# Patient Record
Sex: FEMALE | Race: WHITE | RURAL
[De-identification: names, ages, dates, MRNs, and addresses within clinical notes are randomized per-mention and may not be internally consistent; named-entity substitution may affect disease eponyms.]

---

## 2017-02-16 ENCOUNTER — APPOINTMENT (RX ONLY)
Dept: RURAL CLINIC 2 | Facility: CLINIC | Age: 72
Setting detail: DERMATOLOGY
End: 2017-02-16

## 2017-02-16 DIAGNOSIS — L72.8 OTHER FOLLICULAR CYSTS OF THE SKIN AND SUBCUTANEOUS TISSUE: ICD-10-CM

## 2017-02-16 DIAGNOSIS — D22 MELANOCYTIC NEVI: ICD-10-CM

## 2017-02-16 DIAGNOSIS — L82.1 OTHER SEBORRHEIC KERATOSIS: ICD-10-CM

## 2017-02-16 DIAGNOSIS — D69.2 OTHER NONTHROMBOCYTOPENIC PURPURA: ICD-10-CM

## 2017-02-16 PROBLEM — D22.61 MELANOCYTIC NEVI OF RIGHT UPPER LIMB, INCLUDING SHOULDER: Status: ACTIVE | Noted: 2017-02-16

## 2017-02-16 PROBLEM — D22.5 MELANOCYTIC NEVI OF TRUNK: Status: ACTIVE | Noted: 2017-02-16

## 2017-02-16 PROBLEM — D22.62 MELANOCYTIC NEVI OF LEFT UPPER LIMB, INCLUDING SHOULDER: Status: ACTIVE | Noted: 2017-02-16

## 2017-02-16 PROBLEM — L23.7 ALLERGIC CONTACT DERMATITIS DUE TO PLANTS, EXCEPT FOOD: Status: ACTIVE | Noted: 2017-02-16

## 2017-02-16 PROBLEM — D48.5 NEOPLASM OF UNCERTAIN BEHAVIOR OF SKIN: Status: ACTIVE | Noted: 2017-02-16

## 2017-02-16 PROBLEM — D22.71 MELANOCYTIC NEVI OF RIGHT LOWER LIMB, INCLUDING HIP: Status: ACTIVE | Noted: 2017-02-16

## 2017-02-16 PROCEDURE — 99214 OFFICE O/P EST MOD 30 MIN: CPT

## 2017-02-16 PROCEDURE — ? OBSERVATION AND MEASURE

## 2017-02-16 PROCEDURE — ? COUNSELING

## 2017-02-16 ASSESSMENT — LOCATION DETAILED DESCRIPTION DERM
LOCATION DETAILED: LEFT ANTERIOR LATERAL DISTAL UPPER ARM
LOCATION DETAILED: RIGHT POSTERIOR NECK
LOCATION DETAILED: LEFT SUPERIOR CENTRAL BUCCAL CHEEK
LOCATION DETAILED: RIGHT RADIAL DORSAL HAND
LOCATION DETAILED: LEFT DORSAL RING FINGER METACARPOPHALANGEAL JOINT
LOCATION DETAILED: RIGHT ANTERIOR PROXIMAL UPPER ARM
LOCATION DETAILED: LEFT MID-UPPER BACK
LOCATION DETAILED: LEFT PROXIMAL DORSAL FOREARM
LOCATION DETAILED: RIGHT DORSAL MIDDLE FINGER METACARPOPHALANGEAL JOINT
LOCATION DETAILED: RIGHT PROXIMAL DORSAL FOREARM
LOCATION DETAILED: RIGHT PROXIMAL POSTERIOR THIGH

## 2017-02-16 ASSESSMENT — LOCATION SIMPLE DESCRIPTION DERM
LOCATION SIMPLE: LEFT CHEEK
LOCATION SIMPLE: RIGHT UPPER ARM
LOCATION SIMPLE: LEFT FOREARM
LOCATION SIMPLE: POSTERIOR NECK
LOCATION SIMPLE: RIGHT HAND
LOCATION SIMPLE: RIGHT FOREARM
LOCATION SIMPLE: LEFT UPPER BACK
LOCATION SIMPLE: RIGHT POSTERIOR THIGH
LOCATION SIMPLE: LEFT HAND
LOCATION SIMPLE: LEFT UPPER ARM

## 2017-02-16 ASSESSMENT — LOCATION ZONE DERM
LOCATION ZONE: LEG
LOCATION ZONE: NECK
LOCATION ZONE: ARM
LOCATION ZONE: FACE
LOCATION ZONE: HAND
LOCATION ZONE: TRUNK

## 2017-02-16 NOTE — PROCEDURE: OBSERVATION
Size Of Lesion: .4 x .4 cm
Detail Level: Simple
Size Of Lesion: .3 x .2 cm
Body Location Override (Optional - Billing Will Still Be Based On Selected Body Map Location If Applicable): Left upper arm
Body Location Override (Optional - Billing Will Still Be Based On Selected Body Map Location If Applicable): Left upper lateral back
Body Location Override (Optional - Billing Will Still Be Based On Selected Body Map Location If Applicable): Right dorsal hand distal
Size Of Lesion: .2 x .2 cm
Body Location Override (Optional - Billing Will Still Be Based On Selected Body Map Location If Applicable): Left dorsal hand
Body Location Override (Optional - Billing Will Still Be Based On Selected Body Map Location If Applicable): Right dorsal hand proximal
Size Of Lesion: .1 x .1 cm
Size Of Lesion: .7 x .4 cm
Body Location Override (Optional - Billing Will Still Be Based On Selected Body Map Location If Applicable): Right upper arm
Size Of Lesion: .1 x .1 cm
Size Of Lesion: 1.5 x 1 cm
Body Location Override (Optional - Billing Will Still Be Based On Selected Body Map Location If Applicable): Left lower Cheek
Size Of Lesion: .2 x .2 cm
Body Location Override (Optional - Billing Will Still Be Based On Selected Body Map Location If Applicable): Right upper posterior thigh

## 2017-10-05 ENCOUNTER — APPOINTMENT (RX ONLY)
Dept: RURAL CLINIC 2 | Facility: CLINIC | Age: 72
Setting detail: DERMATOLOGY
End: 2017-10-05

## 2017-10-05 DIAGNOSIS — D22 MELANOCYTIC NEVI: ICD-10-CM

## 2017-10-05 DIAGNOSIS — L72.8 OTHER FOLLICULAR CYSTS OF THE SKIN AND SUBCUTANEOUS TISSUE: ICD-10-CM

## 2017-10-05 DIAGNOSIS — L82.0 INFLAMED SEBORRHEIC KERATOSIS: ICD-10-CM

## 2017-10-05 DIAGNOSIS — L82.1 OTHER SEBORRHEIC KERATOSIS: ICD-10-CM

## 2017-10-05 PROBLEM — D48.5 NEOPLASM OF UNCERTAIN BEHAVIOR OF SKIN: Status: ACTIVE | Noted: 2017-10-05

## 2017-10-05 PROBLEM — D22.71 MELANOCYTIC NEVI OF RIGHT LOWER LIMB, INCLUDING HIP: Status: ACTIVE | Noted: 2017-10-05

## 2017-10-05 PROBLEM — D22.5 MELANOCYTIC NEVI OF TRUNK: Status: ACTIVE | Noted: 2017-10-05

## 2017-10-05 PROBLEM — D22.61 MELANOCYTIC NEVI OF RIGHT UPPER LIMB, INCLUDING SHOULDER: Status: ACTIVE | Noted: 2017-10-05

## 2017-10-05 PROBLEM — D22.62 MELANOCYTIC NEVI OF LEFT UPPER LIMB, INCLUDING SHOULDER: Status: ACTIVE | Noted: 2017-10-05

## 2017-10-05 PROCEDURE — ? BIOPSY BY PUNCH METHOD

## 2017-10-05 PROCEDURE — ? OBSERVATION AND MEASURE

## 2017-10-05 PROCEDURE — 99214 OFFICE O/P EST MOD 30 MIN: CPT | Mod: 25

## 2017-10-05 PROCEDURE — 11100: CPT

## 2017-10-05 PROCEDURE — ? COUNSELING

## 2017-10-05 ASSESSMENT — LOCATION DETAILED DESCRIPTION DERM
LOCATION DETAILED: LEFT CENTRAL BUCCAL CHEEK
LOCATION DETAILED: MID POSTERIOR NECK
LOCATION DETAILED: LEFT INFERIOR MEDIAL UPPER BACK
LOCATION DETAILED: LEFT DISTAL POSTERIOR UPPER ARM
LOCATION DETAILED: RIGHT RADIAL DORSAL HAND
LOCATION DETAILED: LEFT DORSAL RING FINGER METACARPOPHALANGEAL JOINT
LOCATION DETAILED: LEFT MID-UPPER BACK
LOCATION DETAILED: RIGHT DORSAL MIDDLE FINGER METACARPOPHALANGEAL JOINT
LOCATION DETAILED: RIGHT PROXIMAL POSTERIOR THIGH
LOCATION DETAILED: RIGHT LATERAL PROXIMAL UPPER ARM

## 2017-10-05 ASSESSMENT — LOCATION SIMPLE DESCRIPTION DERM
LOCATION SIMPLE: LEFT HAND
LOCATION SIMPLE: POSTERIOR NECK
LOCATION SIMPLE: LEFT CHEEK
LOCATION SIMPLE: RIGHT UPPER ARM
LOCATION SIMPLE: LEFT POSTERIOR UPPER ARM
LOCATION SIMPLE: RIGHT POSTERIOR THIGH
LOCATION SIMPLE: LEFT UPPER BACK
LOCATION SIMPLE: RIGHT HAND

## 2017-10-05 ASSESSMENT — LOCATION ZONE DERM
LOCATION ZONE: FACE
LOCATION ZONE: NECK
LOCATION ZONE: LEG
LOCATION ZONE: HAND
LOCATION ZONE: TRUNK
LOCATION ZONE: ARM

## 2017-10-05 NOTE — PROCEDURE: BIOPSY BY PUNCH METHOD
Post-Care Instructions: I reviewed with the patient in detail post-care instructions. Patient is to keep the biopsy site dry overnight, and then apply bacitracin twice daily until healed. Patient may apply hydrogen peroxide soaks to remove any crusting.
Billing Type: Third-Party Bill
Lab: [id number]
Epidermal Sutures: 4-0 Nylon
Size Of Lesion In Cm (Optional): 0.2
Notification Instructions: Patient will be notified of biopsy results. However, patient instructed to call the office if not contacted within 2 weeks.
Consent: Verbal consent was obtained and risks were reviewed including but not limited to scarring, infection, bleeding, scabbing, incomplete removal, nerve damage and allergy to anesthesia.
Bill For Surgical Tray: no
Additional Anesthesia Volume In Cc (Will Not Render If 0): 0
Patient Will Remove Sutures At Home?: Yes
Biopsy Type: H and E
Dressing: bandage
Lab Facility: [id number]
Detail Level: Simple
Wound Care: Mupirocin
Home Suture Removal Text: Patient was provided a home suture removal kit and will remove their sutures at home.  If they have any questions or difficulties they will call the office.
Suture Removal: 10 days
Anesthesia Volume In Cc (Will Not Render If 0): 0.5
Anesthesia Type: 2% lidocaine with epinephrine
Hemostasis: None
Body Location Override (Optional - Billing Will Still Be Based On Selected Body Map Location If Applicable): right upper arm
Punch Size In Mm: 2

## 2017-10-05 NOTE — PROCEDURE: OBSERVATION
Body Location Override (Optional - Billing Will Still Be Based On Selected Body Map Location If Applicable): left upper lateral back
Body Location Override (Optional - Billing Will Still Be Based On Selected Body Map Location If Applicable): right dorsal hand distal
Detail Level: Detailed
Size Of Lesion: .3 x .2 cm
Body Location Override (Optional - Billing Will Still Be Based On Selected Body Map Location If Applicable): left upper arm
Size Of Lesion: .1 x .1 cm
Body Location Override (Optional - Billing Will Still Be Based On Selected Body Map Location If Applicable): left dorsal hand
Size Of Lesion: .3 x .2 cm
Body Location Override (Optional - Billing Will Still Be Based On Selected Body Map Location If Applicable): left lower Cheek
Body Location Override (Optional - Billing Will Still Be Based On Selected Body Map Location If Applicable): right upper posterior thigh
Size Of Lesion: .4 x .4 cm
Size Of Lesion: 1.5 x 1 cm
Size Of Lesion: .2 x .2 cm
Body Location Override (Optional - Billing Will Still Be Based On Selected Body Map Location If Applicable): right dorsal hand proximal
Size Of Lesion: .7 x .4 cm

## 2018-07-12 ENCOUNTER — APPOINTMENT (RX ONLY)
Dept: RURAL CLINIC 1 | Facility: CLINIC | Age: 73
Setting detail: DERMATOLOGY
End: 2018-07-12

## 2018-07-12 DIAGNOSIS — Z41.9 ENCOUNTER FOR PROCEDURE FOR PURPOSES OTHER THAN REMEDYING HEALTH STATE, UNSPECIFIED: ICD-10-CM

## 2018-07-12 DIAGNOSIS — B35.3 TINEA PEDIS: ICD-10-CM

## 2018-07-12 DIAGNOSIS — D22 MELANOCYTIC NEVI: ICD-10-CM

## 2018-07-12 DIAGNOSIS — L64.8 OTHER ANDROGENIC ALOPECIA: ICD-10-CM

## 2018-07-12 DIAGNOSIS — D18.0 HEMANGIOMA: ICD-10-CM

## 2018-07-12 PROBLEM — D48.5 NEOPLASM OF UNCERTAIN BEHAVIOR OF SKIN: Status: ACTIVE | Noted: 2018-07-12

## 2018-07-12 PROBLEM — D22.5 MELANOCYTIC NEVI OF TRUNK: Status: ACTIVE | Noted: 2018-07-12

## 2018-07-12 PROBLEM — D22.61 MELANOCYTIC NEVI OF RIGHT UPPER LIMB, INCLUDING SHOULDER: Status: ACTIVE | Noted: 2018-07-12

## 2018-07-12 PROBLEM — D22.71 MELANOCYTIC NEVI OF RIGHT LOWER LIMB, INCLUDING HIP: Status: ACTIVE | Noted: 2018-07-12

## 2018-07-12 PROBLEM — D18.01 HEMANGIOMA OF SKIN AND SUBCUTANEOUS TISSUE: Status: ACTIVE | Noted: 2018-07-12

## 2018-07-12 PROBLEM — D22.62 MELANOCYTIC NEVI OF LEFT UPPER LIMB, INCLUDING SHOULDER: Status: ACTIVE | Noted: 2018-07-12

## 2018-07-12 PROCEDURE — ? PRESCRIPTION

## 2018-07-12 PROCEDURE — 99213 OFFICE O/P EST LOW 20 MIN: CPT

## 2018-07-12 PROCEDURE — ? TREATMENT REGIMEN

## 2018-07-12 PROCEDURE — ? OBSERVATION AND MEASURE

## 2018-07-12 PROCEDURE — ? COSMETIC CONSULTATION: FILLERS

## 2018-07-12 RX ORDER — CLOTRIMAZOLE 10 MG/G
CREAM TOPICAL
Qty: 1 | Refills: 2 | Status: ERX | COMMUNITY
Start: 2018-07-12

## 2018-07-12 RX ADMIN — CLOTRIMAZOLE 1: 10 CREAM TOPICAL at 00:00

## 2018-07-12 ASSESSMENT — LOCATION SIMPLE DESCRIPTION DERM
LOCATION SIMPLE: LEFT FOOT
LOCATION SIMPLE: LEFT PLANTAR SURFACE
LOCATION SIMPLE: RIGHT HAND
LOCATION SIMPLE: RIGHT PLANTAR SURFACE
LOCATION SIMPLE: POSTERIOR SCALP
LOCATION SIMPLE: LEFT LIP
LOCATION SIMPLE: LEFT HAND
LOCATION SIMPLE: RIGHT FOOT
LOCATION SIMPLE: LEFT UPPER BACK
LOCATION SIMPLE: RIGHT LIP
LOCATION SIMPLE: POSTERIOR NECK
LOCATION SIMPLE: LEFT CHEEK
LOCATION SIMPLE: RIGHT CHEEK
LOCATION SIMPLE: RIGHT POSTERIOR THIGH

## 2018-07-12 ASSESSMENT — LOCATION ZONE DERM
LOCATION ZONE: LEG
LOCATION ZONE: TRUNK
LOCATION ZONE: NECK
LOCATION ZONE: FACE
LOCATION ZONE: HAND
LOCATION ZONE: LIP
LOCATION ZONE: FEET
LOCATION ZONE: SCALP

## 2018-07-12 ASSESSMENT — LOCATION DETAILED DESCRIPTION DERM
LOCATION DETAILED: LEFT MEDIAL BUCCAL CHEEK
LOCATION DETAILED: LEFT UPPER CUTANEOUS LIP
LOCATION DETAILED: RIGHT UPPER CUTANEOUS LIP
LOCATION DETAILED: RIGHT RADIAL DORSAL HAND
LOCATION DETAILED: LEFT MID-UPPER BACK
LOCATION DETAILED: RIGHT PLANTAR FOREFOOT OVERLYING 3RD METATARSAL
LOCATION DETAILED: LEFT PLANTAR FOREFOOT OVERLYING 2ND METATARSAL
LOCATION DETAILED: RIGHT LATERAL PLANTAR FOOT
LOCATION DETAILED: MID POSTERIOR NECK
LOCATION DETAILED: RIGHT DORSAL MIDDLE FINGER METACARPOPHALANGEAL JOINT
LOCATION DETAILED: POSTERIOR MID-PARIETAL SCALP
LOCATION DETAILED: RIGHT PROXIMAL POSTERIOR THIGH
LOCATION DETAILED: LEFT ULNAR DORSAL HAND
LOCATION DETAILED: LEFT LATERAL DORSAL FOOT
LOCATION DETAILED: RIGHT MEDIAL BUCCAL CHEEK

## 2018-07-12 NOTE — HPI: EVALUATION OF SKIN LESION(S)
Hpi Title: Evaluation of Skin Lesions
Additional History: Patient states she has a toe fungus, but this is being treated by another physician.\\n\\nPatient states she needs a full check.

## 2018-07-12 NOTE — PROCEDURE: TREATMENT REGIMEN
Detail Level: Zone
Continue Regimen: Biotin (ensure dosage is 2,500-5,000mcg) daily
Initiate Treatment: Try Nioxin hair products
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
Action 2: Continue
Show Skinceutical Line: Yes
Other Instructions: Patient was advised that her use of Xarelto will likely cause increased bruising and swelling.
Detail Level: Simple
Initiate Treatment: Clotrimazole Cream BID as needed (Econazole isn’t covered by pt insurance)

## 2018-07-12 NOTE — PROCEDURE: OBSERVATION
Body Location Override (Optional - Billing Will Still Be Based On Selected Body Map Location If Applicable): right dorsal hand distal
Detail Level: Detailed
Size Of Lesion: .3 x .2 cm
Body Location Override (Optional - Billing Will Still Be Based On Selected Body Map Location If Applicable): right dorsal hand proximal
Size Of Lesion: .3 x .3 cm
Body Location Override (Optional - Billing Will Still Be Based On Selected Body Map Location If Applicable): left Dorsal Hand
Size Of Lesion: .3 x .2 cm
Body Location Override (Optional - Billing Will Still Be Based On Selected Body Map Location If Applicable): left upper arm
Size Of Lesion: 1.5 x 1 cm
Body Location Override (Optional - Billing Will Still Be Based On Selected Body Map Location If Applicable): left upper lateral back
Size Of Lesion: .7 x .4 cm
Body Location Override (Optional - Billing Will Still Be Based On Selected Body Map Location If Applicable): right upper posterior thigh
Size Of Lesion: .1 x .1 cm

## 2019-04-18 ENCOUNTER — APPOINTMENT (RX ONLY)
Dept: RURAL CLINIC 1 | Facility: CLINIC | Age: 74
Setting detail: DERMATOLOGY
End: 2019-04-18

## 2019-04-18 DIAGNOSIS — B35.3 TINEA PEDIS: ICD-10-CM

## 2019-04-18 DIAGNOSIS — D22 MELANOCYTIC NEVI: ICD-10-CM

## 2019-04-18 DIAGNOSIS — L82.1 OTHER SEBORRHEIC KERATOSIS: ICD-10-CM

## 2019-04-18 DIAGNOSIS — L64.8 OTHER ANDROGENIC ALOPECIA: ICD-10-CM

## 2019-04-18 PROBLEM — D48.5 NEOPLASM OF UNCERTAIN BEHAVIOR OF SKIN: Status: ACTIVE | Noted: 2019-04-18

## 2019-04-18 PROBLEM — D22.62 MELANOCYTIC NEVI OF LEFT UPPER LIMB, INCLUDING SHOULDER: Status: ACTIVE | Noted: 2019-04-18

## 2019-04-18 PROBLEM — D22.71 MELANOCYTIC NEVI OF RIGHT LOWER LIMB, INCLUDING HIP: Status: ACTIVE | Noted: 2019-04-18

## 2019-04-18 PROBLEM — D22.5 MELANOCYTIC NEVI OF TRUNK: Status: ACTIVE | Noted: 2019-04-18

## 2019-04-18 PROBLEM — D22.61 MELANOCYTIC NEVI OF RIGHT UPPER LIMB, INCLUDING SHOULDER: Status: ACTIVE | Noted: 2019-04-18

## 2019-04-18 PROCEDURE — ? OBSERVATION AND MEASURE

## 2019-04-18 PROCEDURE — ? COUNSELING

## 2019-04-18 PROCEDURE — 99214 OFFICE O/P EST MOD 30 MIN: CPT

## 2019-04-18 PROCEDURE — ? TREATMENT REGIMEN

## 2019-04-18 ASSESSMENT — LOCATION DETAILED DESCRIPTION DERM
LOCATION DETAILED: RIGHT PLANTAR FOREFOOT OVERLYING 3RD METATARSAL
LOCATION DETAILED: POSTERIOR MID-PARIETAL SCALP
LOCATION DETAILED: RIGHT LATERAL PLANTAR FOOT
LOCATION DETAILED: RIGHT PROXIMAL POSTERIOR THIGH
LOCATION DETAILED: RIGHT DORSAL MIDDLE FINGER METACARPOPHALANGEAL JOINT
LOCATION DETAILED: LEFT PLANTAR FOREFOOT OVERLYING 2ND METATARSAL
LOCATION DETAILED: RIGHT PROXIMAL DORSAL FOREARM
LOCATION DETAILED: RIGHT RADIAL DORSAL HAND
LOCATION DETAILED: LEFT MID-UPPER BACK
LOCATION DETAILED: LEFT LATERAL DORSAL FOOT
LOCATION DETAILED: LEFT ULNAR DORSAL HAND

## 2019-04-18 ASSESSMENT — LOCATION ZONE DERM
LOCATION ZONE: TRUNK
LOCATION ZONE: SCALP
LOCATION ZONE: HAND
LOCATION ZONE: FEET
LOCATION ZONE: ARM
LOCATION ZONE: LEG

## 2019-04-18 ASSESSMENT — LOCATION SIMPLE DESCRIPTION DERM
LOCATION SIMPLE: RIGHT FOREARM
LOCATION SIMPLE: RIGHT HAND
LOCATION SIMPLE: RIGHT PLANTAR SURFACE
LOCATION SIMPLE: LEFT HAND
LOCATION SIMPLE: LEFT UPPER BACK
LOCATION SIMPLE: RIGHT POSTERIOR THIGH
LOCATION SIMPLE: RIGHT FOOT
LOCATION SIMPLE: POSTERIOR SCALP
LOCATION SIMPLE: LEFT FOOT
LOCATION SIMPLE: LEFT PLANTAR SURFACE

## 2019-04-18 NOTE — PROCEDURE: TREATMENT REGIMEN
Detail Level: Zone
Initiate Treatment: Try Nioxin hair products
Modify Regimen: Reduce Biotin from high dose(ensure dosage is 2,500-5,000mcg) daily to low dose of no more than 30 mcg.
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
Continue Regimen: Antifungal (clotrimazole) 1 % topical cream  Sig: Apply to feet twice daily as needed. May occlude at night with socks.
Detail Level: Simple

## 2019-04-18 NOTE — PROCEDURE: OBSERVATION
Body Location Override (Optional - Billing Will Still Be Based On Selected Body Map Location If Applicable): left Dorsal Hand
Size Of Lesion: .3 x .2 cm
Detail Level: Detailed
Body Location Override (Optional - Billing Will Still Be Based On Selected Body Map Location If Applicable): right dorsal hand distal
Size Of Lesion: .3 x .2 cm
Body Location Override (Optional - Billing Will Still Be Based On Selected Body Map Location If Applicable): right dorsal hand proximal
Size Of Lesion: .3 x .3 cm
Body Location Override (Optional - Billing Will Still Be Based On Selected Body Map Location If Applicable): left upper arm
Instructions (Optional): Down from 1.5 x 1.0 cm at last visit
Size Of Lesion: 1 x 1 cm
Size Of Lesion: .7 x .4 cm
Body Location Override (Optional - Billing Will Still Be Based On Selected Body Map Location If Applicable): left upper lateral back
Size Of Lesion: .1 x .1 cm
Body Location Override (Optional - Billing Will Still Be Based On Selected Body Map Location If Applicable): right upper posterior thigh

## 2019-06-18 ENCOUNTER — APPOINTMENT (RX ONLY)
Dept: RURAL CLINIC 1 | Facility: CLINIC | Age: 74
Setting detail: DERMATOLOGY
End: 2019-06-18

## 2019-06-18 DIAGNOSIS — Z41.9 ENCOUNTER FOR PROCEDURE FOR PURPOSES OTHER THAN REMEDYING HEALTH STATE, UNSPECIFIED: ICD-10-CM

## 2019-06-18 PROCEDURE — ? CHEMICAL PEEL JESSNER/TCA

## 2019-06-18 PROCEDURE — ? MICRODERMABRASION

## 2019-06-18 ASSESSMENT — LOCATION SIMPLE DESCRIPTION DERM
LOCATION SIMPLE: LEFT CHEEK
LOCATION SIMPLE: RIGHT CHEEK

## 2019-06-18 ASSESSMENT — LOCATION DETAILED DESCRIPTION DERM
LOCATION DETAILED: RIGHT INFERIOR CENTRAL MALAR CHEEK
LOCATION DETAILED: LEFT INFERIOR CENTRAL MALAR CHEEK

## 2019-06-18 ASSESSMENT — LOCATION ZONE DERM: LOCATION ZONE: FACE

## 2019-06-18 NOTE — PROCEDURE: CHEMICAL PEEL JESSNER/TCA
Frost (0,1+,2+,3+,4+): 1+
Post-Care Instructions: I reviewed with the patient in detail post-care instructions. Patient should avoid sun exposure and wear sun protection.
Time (Mins): 0
Number Of Coats: 3
Chemical Peel: Jessners/15% TCA
Detail Level: Zone
Consent: Prior to the procedure, written consent was obtained and risks were reviewed, including but not limited to: redness, peeling, blistering, pigmentary change, scarring, infection, and pain.
Prep: The treated area was cleansed.
Erythema: moderate
Post Peel Care: After the procedure, sunscreen was applied to the treatment area . Sun protection and post-care instructions were reviewed with the patient.

## 2019-06-18 NOTE — HPI: COSMETIC (MICRODERMABRASION)
Have You Had Microdermabrasion Before?: has had previous microdermabrasion
When Was Your Last Microdermabrasion?: 2017

## 2019-06-18 NOTE — PROCEDURE: MICRODERMABRASION
Consent: Verbal consent obtained, risks reviewed including but not limited to crusting, scabbing, blistering, scarring, darker or lighter pigmentary change, bruising, and/or incomplete response.
Treatment Number: 1
Wand: Fine
Number Of Passes: 2
Post-Care Instructions: I reviewed with the patient in detail post-care instructions. Patient should stay away from the sun and wear sun protection until treated areas are fully healed.
Vacuum Pressure Units: inches Hg
Endpoint: mild erythema
Crystal Flow: none
Prep Text: The patients skin was cleaned and prepped. Sunscreen was applied.
Indication: skin texture
Detail Level: Zone
Vacuum Pressure: 5

## 2019-08-27 ENCOUNTER — APPOINTMENT (RX ONLY)
Dept: RURAL CLINIC 1 | Facility: CLINIC | Age: 74
Setting detail: DERMATOLOGY
End: 2019-08-27

## 2019-08-27 DIAGNOSIS — Z41.9 ENCOUNTER FOR PROCEDURE FOR PURPOSES OTHER THAN REMEDYING HEALTH STATE, UNSPECIFIED: ICD-10-CM

## 2019-08-27 PROCEDURE — ? BOTOX

## 2019-08-27 PROCEDURE — ? JUVEDERM ULTRA XC INJECTION

## 2019-08-27 ASSESSMENT — LOCATION DETAILED DESCRIPTION DERM
LOCATION DETAILED: LEFT INFERIOR TEMPLE
LOCATION DETAILED: LEFT SUPERIOR MEDIAL BUCCAL CHEEK
LOCATION DETAILED: LEFT CENTRAL BUCCAL CHEEK
LOCATION DETAILED: RIGHT MEDIAL BUCCAL CHEEK
LOCATION DETAILED: LEFT SUPERIOR CENTRAL BUCCAL CHEEK
LOCATION DETAILED: LEFT MID TEMPLE
LOCATION DETAILED: RIGHT MID TEMPLE
LOCATION DETAILED: LEFT INFERIOR MEDIAL MALAR CHEEK
LOCATION DETAILED: RIGHT INFERIOR MEDIAL MALAR CHEEK
LOCATION DETAILED: RIGHT INFERIOR TEMPLE
LOCATION DETAILED: LEFT LOWER CUTANEOUS LIP
LOCATION DETAILED: RIGHT UPPER CUTANEOUS LIP
LOCATION DETAILED: RIGHT LOWER CUTANEOUS LIP
LOCATION DETAILED: RIGHT SUPERIOR MEDIAL BUCCAL CHEEK
LOCATION DETAILED: RIGHT PHILTRAL RIDGE
LOCATION DETAILED: LEFT UPPER CUTANEOUS LIP

## 2019-08-27 ASSESSMENT — LOCATION SIMPLE DESCRIPTION DERM
LOCATION SIMPLE: RIGHT CHEEK
LOCATION SIMPLE: LEFT CHEEK
LOCATION SIMPLE: LEFT LIP
LOCATION SIMPLE: RIGHT LIP
LOCATION SIMPLE: RIGHT TEMPLE
LOCATION SIMPLE: LEFT TEMPLE

## 2019-08-27 ASSESSMENT — LOCATION ZONE DERM
LOCATION ZONE: FACE
LOCATION ZONE: LIP

## 2019-08-27 NOTE — HPI: COSMETIC (FILLERS)
Have You Had Fillers Before?: has had fillers
When Was Your Last Filler Injection?: Over 10 years ago
Additional History: Patient reports that she is on Xalerto but discontinued use on Sunday, 8/25/2019.

## 2019-08-27 NOTE — PROCEDURE: BOTOX
Depressor Anguli Oris Units: 0
Reconstitution Date (Optional): 08/13/19
Lot #: [id number]
Consent: Written consent obtained. Risks include but not limited to lid/brow ptosis, bruising, swelling, diplopia, temporary effect, incomplete chemical denervation.
Expiration Date (Month Year): 01/2020
Detail Level: Zone
Periorbital Skin Units: 16
Post-Care Instructions: Patient instructed to not lie down for 4 hours and limit physical activity for 24 hours. Patient instructed not to travel by airplane for 48 hours.
Dilution (U/0.1 Cc): 4

## 2019-08-27 NOTE — PROCEDURE: JUVEDERM ULTRA XC INJECTION
Additional Area 3 Volume In Cc: 0
Procedural Text: The filler was administered to the treatment areas noted above.
Filler: Juvederm Ultra XC
Post-Care Instructions: Patient instructed to apply ice to reduce swelling.
Include Cannula Information In Note?: No
Lot #: [id number]
Additional Anesthesia Volume In Cc: 6
Topical Anesthesia?: BLT cream (benzocaine 20%, lidocaine 10%, tetracaine 10%)
Expiration Date (Month Year): 03/02/2020
Anesthesia Volume In Cc: 0.5
Detail Level: Zone
Consent: Written consent obtained. Risks include but not limited to bruising, beading, irregular texture, ulceration, infection, allergic reaction, scar formation, incomplete augmentation, temporary nature, procedural pain.
Map Statment: See Attach Map for Complete Details

## 2019-10-01 ENCOUNTER — RX ONLY (OUTPATIENT)
Age: 74
Setting detail: RX ONLY
End: 2019-10-01

## 2019-10-01 RX ORDER — TRETINOIN 0.5 MG/G
CREAM TOPICAL
Qty: 1 | Refills: 2 | COMMUNITY
Start: 2019-10-01

## 2020-01-16 ENCOUNTER — APPOINTMENT (RX ONLY)
Dept: RURAL CLINIC 1 | Facility: CLINIC | Age: 75
Setting detail: DERMATOLOGY
End: 2020-01-16

## 2020-01-16 DIAGNOSIS — D18.0 HEMANGIOMA: ICD-10-CM

## 2020-01-16 DIAGNOSIS — Z41.9 ENCOUNTER FOR PROCEDURE FOR PURPOSES OTHER THAN REMEDYING HEALTH STATE, UNSPECIFIED: ICD-10-CM

## 2020-01-16 DIAGNOSIS — B35.3 TINEA PEDIS: ICD-10-CM

## 2020-01-16 DIAGNOSIS — L73.9 FOLLICULAR DISORDER, UNSPECIFIED: ICD-10-CM

## 2020-01-16 DIAGNOSIS — D22 MELANOCYTIC NEVI: ICD-10-CM

## 2020-01-16 DIAGNOSIS — L64.8 OTHER ANDROGENIC ALOPECIA: ICD-10-CM

## 2020-01-16 DIAGNOSIS — L738 OTHER SPECIFIED DISEASES OF HAIR AND HAIR FOLLICLES: ICD-10-CM

## 2020-01-16 DIAGNOSIS — L663 OTHER SPECIFIED DISEASES OF HAIR AND HAIR FOLLICLES: ICD-10-CM

## 2020-01-16 PROBLEM — D18.01 HEMANGIOMA OF SKIN AND SUBCUTANEOUS TISSUE: Status: ACTIVE | Noted: 2020-01-16

## 2020-01-16 PROBLEM — L02.821 FURUNCLE OF HEAD [ANY PART, EXCEPT FACE]: Status: ACTIVE | Noted: 2020-01-16

## 2020-01-16 PROBLEM — D22.5 MELANOCYTIC NEVI OF TRUNK: Status: ACTIVE | Noted: 2020-01-16

## 2020-01-16 PROBLEM — D48.5 NEOPLASM OF UNCERTAIN BEHAVIOR OF SKIN: Status: ACTIVE | Noted: 2020-01-16

## 2020-01-16 PROBLEM — D22.61 MELANOCYTIC NEVI OF RIGHT UPPER LIMB, INCLUDING SHOULDER: Status: ACTIVE | Noted: 2020-01-16

## 2020-01-16 PROBLEM — D22.62 MELANOCYTIC NEVI OF LEFT UPPER LIMB, INCLUDING SHOULDER: Status: ACTIVE | Noted: 2020-01-16

## 2020-01-16 PROBLEM — D22.71 MELANOCYTIC NEVI OF RIGHT LOWER LIMB, INCLUDING HIP: Status: ACTIVE | Noted: 2020-01-16

## 2020-01-16 PROCEDURE — ? OBSERVATION AND MEASURE

## 2020-01-16 PROCEDURE — ? TREATMENT REGIMEN

## 2020-01-16 PROCEDURE — ? PRESCRIPTION

## 2020-01-16 PROCEDURE — 99214 OFFICE O/P EST MOD 30 MIN: CPT

## 2020-01-16 RX ORDER — CLOTRIMAZOLE 10 MG/G
CREAM TOPICAL
Qty: 1 | Refills: 2 | Status: ERX | COMMUNITY
Start: 2020-01-16

## 2020-01-16 RX ADMIN — CLOTRIMAZOLE 1: 10 CREAM TOPICAL at 00:00

## 2020-01-16 ASSESSMENT — LOCATION SIMPLE DESCRIPTION DERM
LOCATION SIMPLE: RIGHT CHEEK
LOCATION SIMPLE: INFERIOR FOREHEAD
LOCATION SIMPLE: LEFT PLANTAR SURFACE
LOCATION SIMPLE: RIGHT POSTERIOR THIGH
LOCATION SIMPLE: RIGHT FOOT
LOCATION SIMPLE: NOSE
LOCATION SIMPLE: POSTERIOR NECK
LOCATION SIMPLE: LEFT FOOT
LOCATION SIMPLE: LEFT HAND
LOCATION SIMPLE: RIGHT PLANTAR SURFACE
LOCATION SIMPLE: CHIN
LOCATION SIMPLE: LEFT CHEEK
LOCATION SIMPLE: POSTERIOR SCALP
LOCATION SIMPLE: RIGHT HAND
LOCATION SIMPLE: LEFT UPPER BACK
LOCATION SIMPLE: SCALP

## 2020-01-16 ASSESSMENT — LOCATION ZONE DERM
LOCATION ZONE: SCALP
LOCATION ZONE: NECK
LOCATION ZONE: HAND
LOCATION ZONE: NOSE
LOCATION ZONE: TRUNK
LOCATION ZONE: FACE
LOCATION ZONE: LEG
LOCATION ZONE: FEET

## 2020-01-16 ASSESSMENT — LOCATION DETAILED DESCRIPTION DERM
LOCATION DETAILED: LEFT CHIN
LOCATION DETAILED: POSTERIOR MID-PARIETAL SCALP
LOCATION DETAILED: RIGHT PLANTAR FOREFOOT OVERLYING 3RD METATARSAL
LOCATION DETAILED: MID POSTERIOR NECK
LOCATION DETAILED: INFERIOR MID FOREHEAD
LOCATION DETAILED: LEFT CENTRAL MALAR CHEEK
LOCATION DETAILED: RIGHT DORSAL FOOT
LOCATION DETAILED: LEFT MID-UPPER BACK
LOCATION DETAILED: LEFT SUPERIOR PARIETAL SCALP
LOCATION DETAILED: NASAL SUPRATIP
LOCATION DETAILED: RIGHT DORSAL MIDDLE FINGER METACARPOPHALANGEAL JOINT
LOCATION DETAILED: RIGHT INFERIOR CENTRAL MALAR CHEEK
LOCATION DETAILED: LEFT ULNAR DORSAL HAND
LOCATION DETAILED: RIGHT PROXIMAL POSTERIOR THIGH
LOCATION DETAILED: LEFT MEDIAL PLANTAR MIDFOOT
LOCATION DETAILED: LEFT DORSAL FOOT
LOCATION DETAILED: RIGHT RADIAL DORSAL HAND

## 2020-01-16 NOTE — PROCEDURE: MIPS QUALITY
Quality 110: Preventive Care And Screening: Influenza Immunization: Influenza Immunization Administered during Influenza season
Quality 131: Pain Assessment And Follow-Up: Pain assessment using a standardized tool is documented as negative, no follow-up plan required
Quality 111:Pneumonia Vaccination Status For Older Adults: Pneumococcal Vaccination Previously Received
Quality 226: Preventive Care And Screening: Tobacco Use: Screening And Cessation Intervention: Patient screened for tobacco use and is an ex/non-smoker
Quality 130: Documentation Of Current Medications In The Medical Record: Current Medications Documented
Detail Level: Detailed

## 2020-01-16 NOTE — PROCEDURE: OBSERVATION
Body Location Override (Optional - Billing Will Still Be Based On Selected Body Map Location If Applicable): left Dorsal Hand
Detail Level: Detailed
Size Of Lesion: .3 x .2 cm
Body Location Override (Optional - Billing Will Still Be Based On Selected Body Map Location If Applicable): right dorsal hand distal
Size Of Lesion: .3 x .2 cm
Size Of Lesion: .3 x .3 cm
Body Location Override (Optional - Billing Will Still Be Based On Selected Body Map Location If Applicable): right dorsal hand proximal
Body Location Override (Optional - Billing Will Still Be Based On Selected Body Map Location If Applicable): left upper arm
Instructions (Optional): Down from 1.5 x 1.0 cm at last visit
Size Of Lesion: 1 x 1 cm
Size Of Lesion: .7 x .4 cm
Body Location Override (Optional - Billing Will Still Be Based On Selected Body Map Location If Applicable): left upper lateral back
Body Location Override (Optional - Billing Will Still Be Based On Selected Body Map Location If Applicable): right upper posterior thigh
Size Of Lesion: .1 x .1 cm

## 2020-01-16 NOTE — PROCEDURE: TREATMENT REGIMEN
Initiate Treatment: Try OTC Viviscal Products
Detail Level: Zone
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
Modify Regimen: Reduce Biotin from high dose(ensure dosage is 2,500-5,000mcg) daily to low dose of no more than 30 mcg.
Initiate Treatment: Clotrimazole Cream BID as needed as patient states this helps
Initiate Treatment: Tretinoin Cream 0.05% to apply a pea sized drop to face nightly as tolerated, 45 tube with 3 Refills (written Rx given)

## 2020-10-15 ENCOUNTER — APPOINTMENT (RX ONLY)
Dept: RURAL CLINIC 1 | Facility: CLINIC | Age: 75
Setting detail: DERMATOLOGY
End: 2020-10-15

## 2020-10-15 DIAGNOSIS — L82.1 OTHER SEBORRHEIC KERATOSIS: ICD-10-CM

## 2020-10-15 DIAGNOSIS — D22 MELANOCYTIC NEVI: ICD-10-CM

## 2020-10-15 DIAGNOSIS — L70.8 OTHER ACNE: ICD-10-CM

## 2020-10-15 PROBLEM — D22.5 MELANOCYTIC NEVI OF TRUNK: Status: ACTIVE | Noted: 2020-10-15

## 2020-10-15 PROBLEM — D22.61 MELANOCYTIC NEVI OF RIGHT UPPER LIMB, INCLUDING SHOULDER: Status: ACTIVE | Noted: 2020-10-15

## 2020-10-15 PROBLEM — D48.5 NEOPLASM OF UNCERTAIN BEHAVIOR OF SKIN: Status: ACTIVE | Noted: 2020-10-15

## 2020-10-15 PROBLEM — D22.71 MELANOCYTIC NEVI OF RIGHT LOWER LIMB, INCLUDING HIP: Status: ACTIVE | Noted: 2020-10-15

## 2020-10-15 PROBLEM — D22.62 MELANOCYTIC NEVI OF LEFT UPPER LIMB, INCLUDING SHOULDER: Status: ACTIVE | Noted: 2020-10-15

## 2020-10-15 PROCEDURE — 99214 OFFICE O/P EST MOD 30 MIN: CPT

## 2020-10-15 PROCEDURE — ? TREATMENT REGIMEN

## 2020-10-15 PROCEDURE — ? OBSERVATION AND MEASURE

## 2020-10-15 PROCEDURE — ? COUNSELING

## 2020-10-15 ASSESSMENT — LOCATION DETAILED DESCRIPTION DERM
LOCATION DETAILED: LEFT ULNAR DORSAL HAND
LOCATION DETAILED: RIGHT DORSAL MIDDLE FINGER METACARPOPHALANGEAL JOINT
LOCATION DETAILED: MID POSTERIOR NECK
LOCATION DETAILED: LEFT MID-UPPER BACK
LOCATION DETAILED: RIGHT PROXIMAL POSTERIOR THIGH
LOCATION DETAILED: RIGHT RADIAL DORSAL HAND
LOCATION DETAILED: INFERIOR THORACIC SPINE

## 2020-10-15 ASSESSMENT — LOCATION SIMPLE DESCRIPTION DERM
LOCATION SIMPLE: UPPER BACK
LOCATION SIMPLE: LEFT HAND
LOCATION SIMPLE: RIGHT POSTERIOR THIGH
LOCATION SIMPLE: POSTERIOR NECK
LOCATION SIMPLE: LEFT UPPER BACK
LOCATION SIMPLE: RIGHT HAND

## 2020-10-15 ASSESSMENT — LOCATION ZONE DERM
LOCATION ZONE: HAND
LOCATION ZONE: NECK
LOCATION ZONE: LEG
LOCATION ZONE: TRUNK

## 2020-10-15 NOTE — PROCEDURE: OBSERVATION
Body Location Override (Optional - Billing Will Still Be Based On Selected Body Map Location If Applicable): left Dorsal Hand
Detail Level: Detailed
Size Of Lesion: .3 x .2 cm
Body Location Override (Optional - Billing Will Still Be Based On Selected Body Map Location If Applicable): right dorsal hand distal
Size Of Lesion: .3 x .2 cm
Size Of Lesion: .3 x .3 cm
Body Location Override (Optional - Billing Will Still Be Based On Selected Body Map Location If Applicable): right dorsal hand proximal
Body Location Override (Optional - Billing Will Still Be Based On Selected Body Map Location If Applicable): left upper arm
Size Of Lesion: 1 x 1 cm
Size Of Lesion: .7 x .4 cm
Body Location Override (Optional - Billing Will Still Be Based On Selected Body Map Location If Applicable): left upper lateral back
Body Location Override (Optional - Billing Will Still Be Based On Selected Body Map Location If Applicable): right upper posterior thigh
Size Of Lesion: .1 x .1 cm

## 2021-08-05 ENCOUNTER — APPOINTMENT (RX ONLY)
Dept: RURAL CLINIC 1 | Facility: CLINIC | Age: 76
Setting detail: DERMATOLOGY
End: 2021-08-05

## 2021-08-05 DIAGNOSIS — L90.8 OTHER ATROPHIC DISORDERS OF SKIN: ICD-10-CM

## 2021-08-05 DIAGNOSIS — D22 MELANOCYTIC NEVI: ICD-10-CM

## 2021-08-05 DIAGNOSIS — L82.1 OTHER SEBORRHEIC KERATOSIS: ICD-10-CM

## 2021-08-05 DIAGNOSIS — L72.0 EPIDERMAL CYST: ICD-10-CM

## 2021-08-05 DIAGNOSIS — Z41.9 ENCOUNTER FOR PROCEDURE FOR PURPOSES OTHER THAN REMEDYING HEALTH STATE, UNSPECIFIED: ICD-10-CM

## 2021-08-05 DIAGNOSIS — Z71.89 OTHER SPECIFIED COUNSELING: ICD-10-CM

## 2021-08-05 PROBLEM — D22.62 MELANOCYTIC NEVI OF LEFT UPPER LIMB, INCLUDING SHOULDER: Status: ACTIVE | Noted: 2021-08-05

## 2021-08-05 PROBLEM — D22.5 MELANOCYTIC NEVI OF TRUNK: Status: ACTIVE | Noted: 2021-08-05

## 2021-08-05 PROBLEM — D22.61 MELANOCYTIC NEVI OF RIGHT UPPER LIMB, INCLUDING SHOULDER: Status: ACTIVE | Noted: 2021-08-05

## 2021-08-05 PROBLEM — D48.5 NEOPLASM OF UNCERTAIN BEHAVIOR OF SKIN: Status: ACTIVE | Noted: 2021-08-05

## 2021-08-05 PROBLEM — D22.71 MELANOCYTIC NEVI OF RIGHT LOWER LIMB, INCLUDING HIP: Status: ACTIVE | Noted: 2021-08-05

## 2021-08-05 PROCEDURE — ? TREATMENT REGIMEN

## 2021-08-05 PROCEDURE — ? COUNSELING

## 2021-08-05 PROCEDURE — ? COSMETIC CONSULTATION: FILLERS

## 2021-08-05 PROCEDURE — ? OBSERVATION AND MEASURE

## 2021-08-05 PROCEDURE — 99213 OFFICE O/P EST LOW 20 MIN: CPT

## 2021-08-05 ASSESSMENT — LOCATION DETAILED DESCRIPTION DERM
LOCATION DETAILED: RIGHT LOWER CUTANEOUS LIP
LOCATION DETAILED: LEFT ULNAR DORSAL HAND
LOCATION DETAILED: RIGHT DORSAL MIDDLE FINGER METACARPOPHALANGEAL JOINT
LOCATION DETAILED: LEFT UPPER CUTANEOUS LIP
LOCATION DETAILED: RIGHT RADIAL DORSAL HAND
LOCATION DETAILED: LEFT LOWER CUTANEOUS LIP
LOCATION DETAILED: RIGHT UPPER CUTANEOUS LIP
LOCATION DETAILED: RIGHT PROXIMAL POSTERIOR THIGH
LOCATION DETAILED: MID POSTERIOR NECK
LOCATION DETAILED: RIGHT CENTRAL POSTAURICULAR SKIN
LOCATION DETAILED: LEFT MID-UPPER BACK

## 2021-08-05 ASSESSMENT — LOCATION ZONE DERM
LOCATION ZONE: TRUNK
LOCATION ZONE: LEG
LOCATION ZONE: HAND
LOCATION ZONE: SCALP
LOCATION ZONE: NECK
LOCATION ZONE: LIP

## 2021-08-05 ASSESSMENT — LOCATION SIMPLE DESCRIPTION DERM
LOCATION SIMPLE: RIGHT POSTERIOR THIGH
LOCATION SIMPLE: RIGHT HAND
LOCATION SIMPLE: LEFT UPPER BACK
LOCATION SIMPLE: LEFT LIP
LOCATION SIMPLE: SCALP
LOCATION SIMPLE: RIGHT LIP
LOCATION SIMPLE: LEFT HAND
LOCATION SIMPLE: POSTERIOR NECK

## 2021-08-05 NOTE — PROCEDURE: MIPS QUALITY
Quality 47: Advance Care Plan: Advance care planning not documented, reason not otherwise specified.
Quality 431: Preventive Care And Screening: Unhealthy Alcohol Use - Screening: Patient screened for unhealthy alcohol use using a single question and scores less than 2 times per year
Quality 110: Preventive Care And Screening: Influenza Immunization: Influenza Immunization Administered during Influenza season
Quality 131: Pain Assessment And Follow-Up: Pain assessment using a standardized tool is documented as negative, no follow-up plan required
Quality 111:Pneumonia Vaccination Status For Older Adults: Pneumococcal Vaccination Previously Received
Quality 226: Preventive Care And Screening: Tobacco Use: Screening And Cessation Intervention: Patient screened for tobacco use and is an ex/non-smoker
Quality 130: Documentation Of Current Medications In The Medical Record: Current Medications Documented
Detail Level: Detailed

## 2021-08-05 NOTE — PROCEDURE: TREATMENT REGIMEN
Initiate Treatment: Try OTC [place] Collagen Products
Detail Level: Generalized
Plan: The lesion was extracted with an 11 blade and qtips.
Detail Level: Simple
Samples Given: Brochure given for Sculptra

## 2021-08-27 ENCOUNTER — APPOINTMENT (RX ONLY)
Dept: RURAL CLINIC 1 | Facility: CLINIC | Age: 76
Setting detail: DERMATOLOGY
End: 2021-08-27

## 2021-08-27 DIAGNOSIS — Z41.9 ENCOUNTER FOR PROCEDURE FOR PURPOSES OTHER THAN REMEDYING HEALTH STATE, UNSPECIFIED: ICD-10-CM

## 2021-08-27 PROCEDURE — ? DYSPORT

## 2021-08-27 PROCEDURE — ? ADDITIONAL NOTES

## 2021-08-27 PROCEDURE — ? PHOTO-DOCUMENTATION

## 2021-08-27 PROCEDURE — ? COSMETIC CONSULTATION: DYSPORT

## 2021-08-27 PROCEDURE — ? COSMETIC CONSULTATION: FILLERS

## 2021-08-27 ASSESSMENT — LOCATION SIMPLE DESCRIPTION DERM
LOCATION SIMPLE: RIGHT FOREHEAD
LOCATION SIMPLE: SUPERIOR FOREHEAD
LOCATION SIMPLE: RIGHT TEMPLE
LOCATION SIMPLE: RIGHT EYEBROW
LOCATION SIMPLE: LEFT FOREHEAD
LOCATION SIMPLE: RIGHT CHEEK
LOCATION SIMPLE: LEFT TEMPLE
LOCATION SIMPLE: GLABELLA
LOCATION SIMPLE: LEFT CHEEK
LOCATION SIMPLE: RIGHT LIP
LOCATION SIMPLE: LEFT LIP
LOCATION SIMPLE: LEFT EYEBROW

## 2021-08-27 ASSESSMENT — LOCATION DETAILED DESCRIPTION DERM
LOCATION DETAILED: SUPERIOR MID FOREHEAD
LOCATION DETAILED: RIGHT UPPER CUTANEOUS LIP
LOCATION DETAILED: LEFT SUPERIOR FOREHEAD
LOCATION DETAILED: LEFT LOWER CUTANEOUS LIP
LOCATION DETAILED: RIGHT INFERIOR TEMPLE
LOCATION DETAILED: RIGHT SUPERIOR LATERAL MALAR CHEEK
LOCATION DETAILED: LEFT SUPERIOR CENTRAL MALAR CHEEK
LOCATION DETAILED: RIGHT MEDIAL EYEBROW
LOCATION DETAILED: LEFT CENTRAL EYEBROW
LOCATION DETAILED: LEFT MEDIAL EYEBROW
LOCATION DETAILED: LEFT UPPER CUTANEOUS LIP
LOCATION DETAILED: GLABELLA
LOCATION DETAILED: RIGHT FOREHEAD
LOCATION DETAILED: RIGHT LOWER CUTANEOUS LIP
LOCATION DETAILED: LEFT INFERIOR TEMPLE
LOCATION DETAILED: RIGHT SUPERIOR FOREHEAD
LOCATION DETAILED: RIGHT SUPERIOR CENTRAL MALAR CHEEK
LOCATION DETAILED: RIGHT CENTRAL EYEBROW
LOCATION DETAILED: LEFT FOREHEAD
LOCATION DETAILED: LEFT SUPERIOR LATERAL MALAR CHEEK

## 2021-08-27 ASSESSMENT — LOCATION ZONE DERM
LOCATION ZONE: FACE
LOCATION ZONE: LIP

## 2021-08-27 NOTE — PROCEDURE: ADDITIONAL NOTES
Detail Level: Simple
Additional Notes: History of juvederm, Botox and chemical peels with Dr. Walker, last injections were in 2019
Render Risk Assessment In Note?: no

## 2021-08-27 NOTE — PROCEDURE: DYSPORT
Show Mentalis Units: No
Price (Use Numbers Only, No Special Characters Or $): 510
Additional Area 4 Units: 0
Detail Level: Detailed
Show Additional Area 2: Yes
Lot #: [id number]
Periorbital Skin Units: 48
Dilution (U/ 0.1cc): 4
Forehead Units: 36
Consent: Written consent obtained. Risks include but not limited to lid/brow ptosis, bruising, swelling, diplopia, temporary effect, incomplete chemical denervation.
Topical Anesthesia?: BLT cream (benzocaine 20%, lidocaine 10%, tetracaine 10%)
Additional Area 1 Location: marionette lines
Expiration Date (Month Year): 3/2022
Additional Area 1 Units: 36
Glabellar Complex Units: 60
Post-Care Instructions: Patient instructed to not lie down for 4 hours and limit physical activity for 24 hours.

## 2021-08-27 NOTE — PROCEDURE: PHOTO-DOCUMENTATION
Photo Preface (Leave Blank If You Do Not Want): Photographs were obtained today
Detail Level: Zone
Details (Free Text): Before pictures

## 2021-10-04 ENCOUNTER — APPOINTMENT (RX ONLY)
Dept: RURAL CLINIC 1 | Facility: CLINIC | Age: 76
Setting detail: DERMATOLOGY
End: 2021-10-04

## 2021-10-04 DIAGNOSIS — Z41.9 ENCOUNTER FOR PROCEDURE FOR PURPOSES OTHER THAN REMEDYING HEALTH STATE, UNSPECIFIED: ICD-10-CM

## 2021-10-04 PROCEDURE — ? COSMETIC CONSULTATION: DYSPORT

## 2021-10-04 PROCEDURE — ? SCULPTRA

## 2021-10-04 PROCEDURE — ? ADDITIONAL NOTES

## 2021-10-04 NOTE — PROCEDURE: SCULPTRA
Show Depth Variable?: Yes
Left Forehead Volume In Cc: 0
Show Right And Left Temple Volume?: No
Detail Level: Detailed
Price (Use Numbers Only, No Special Characters Or $): 0.00
Volumizer: Sculptra
Dilution Method: The Sculptra was diluted with 8 ml of sterile water and 1 ml of 2% Xylocaine for a total volume of 9ccs for each vial.
Post-Care Instructions: Patient instructed to apply ice to reduce swelling.
Injection Technique: The Sculptra was injected to the listed areas after cleansing the skin and providing appropriate anesthesia.
Vials Reconstituted: 2
Lot #: [id number]
Lateral Face Sculptra Volume In Cc: 18
Map Statement: See Attached Map for Complete Details.
Treatment Number: 1
Expiration Date (Month Year): 11 2023
Topical Anesthesia?: BLT cream (benzocaine 20%, lidocaine 10%, tetracaine 10%)
Consent: Written consent obtained. Risks include but not limited to bruising, beading, irregular texture, ulceration, infection, allergic reaction, scar formation, incomplete augmentation, temporary nature, procedural pain.

## 2021-10-04 NOTE — PROCEDURE: ADDITIONAL NOTES
Additional Notes: Patient is purchasing 2 vials of Sculptra today, she is is getting the October Special patient will have 40 free units of Dysport.
Render Risk Assessment In Note?: no
Detail Level: Simple

## 2021-12-08 ENCOUNTER — APPOINTMENT (RX ONLY)
Dept: RURAL CLINIC 1 | Facility: CLINIC | Age: 76
Setting detail: DERMATOLOGY
End: 2021-12-08

## 2021-12-08 DIAGNOSIS — Z41.9 ENCOUNTER FOR PROCEDURE FOR PURPOSES OTHER THAN REMEDYING HEALTH STATE, UNSPECIFIED: ICD-10-CM

## 2021-12-08 PROCEDURE — ? SCULPTRA

## 2021-12-08 ASSESSMENT — LOCATION ZONE DERM: LOCATION ZONE: FACE

## 2021-12-08 ASSESSMENT — LOCATION SIMPLE DESCRIPTION DERM
LOCATION SIMPLE: LEFT CHEEK
LOCATION SIMPLE: RIGHT CHEEK

## 2021-12-08 NOTE — PROCEDURE: SCULPTRA
Additional Area 1 Volume In Cc: 0
Show Zygomatic Arches Volume?: Yes
Show Right And Left Mmc Volume?: No
Anesthesia Type: 1% lidocaine without epinephrine
Dilution Method: The Sculptra was diluted with 8 ml of sterile water on 12/1 and then 1 cc BSW and 1 cc 2% xylocain for a total volume of 10 ccs for each vial.
Detail Level: Detailed
Injection Technique: The Sculptra was injected to the listed areas after cleansing the skin and providing appropriate anesthesia.
Vials Reconstituted: 2
Consent: Written consent obtained. Risks include but not limited to bruising, beading, irregular texture, ulceration, infection, allergic reaction, scar formation, incomplete augmentation, temporary nature, procedural pain.
Lot #: [id number]
Anesthesia Volume In Cc: 1
Reconstitution Date: 12/1/21
Map Statement: See Attached Map for Complete Details.
Expiration Date (Month Year): 01/31/2024
Post-Care Instructions: Patient instructed to apply ice to reduce swelling.
Topical Anesthesia?: BLT cream (benzocaine 20%, lidocaine 10%, tetracaine 10%)
Volumizer: Sculptra

## 2022-02-14 ENCOUNTER — APPOINTMENT (RX ONLY)
Dept: RURAL CLINIC 1 | Facility: CLINIC | Age: 77
Setting detail: DERMATOLOGY
End: 2022-02-14

## 2022-02-14 DIAGNOSIS — Z41.9 ENCOUNTER FOR PROCEDURE FOR PURPOSES OTHER THAN REMEDYING HEALTH STATE, UNSPECIFIED: ICD-10-CM

## 2022-02-14 PROCEDURE — ? TREATMENT REGIMEN

## 2022-02-14 PROCEDURE — ? SCULPTRA

## 2022-02-14 ASSESSMENT — LOCATION ZONE DERM: LOCATION ZONE: FACE

## 2022-02-14 ASSESSMENT — LOCATION DETAILED DESCRIPTION DERM
LOCATION DETAILED: LEFT INFERIOR CENTRAL MALAR CHEEK
LOCATION DETAILED: RIGHT INFERIOR CENTRAL MALAR CHEEK

## 2022-02-14 ASSESSMENT — LOCATION SIMPLE DESCRIPTION DERM
LOCATION SIMPLE: RIGHT CHEEK
LOCATION SIMPLE: LEFT CHEEK

## 2022-02-14 NOTE — PROCEDURE: TREATMENT REGIMEN
Detail Level: Zone
Plan: Patient is concerned she is not seeing the amount of collagen production she would like. I reinforced that changes are subtle and take time. We discussed that we would complete her 6 vials and then decide how to move forward with therapy. She is closer to 79 and thus 8 vials of sculptra is the baseline recommendation. Her areas of major concern are her nasal labial folds. We did discuss possibly switching to fillers at f/u.

## 2022-02-14 NOTE — PROCEDURE: SCULPTRA
Additional Area 1 Volume In Cc: 0
Show Zygomatic Arches Volume?: Yes
Show Right And Left Mmc Volume?: No
Anesthesia Type: 1% lidocaine without epinephrine
Dilution Method: The Sculptra was diluted with 5ml of sterile water on 2/14/22 at 9 am and then 2 cc BSW and 1 cc 2% xylocain right before injection of the product for a total volume of 8 ccs for each vial.
Detail Level: Detailed
Injection Technique: The Sculptra was injected to the listed areas after cleansing the skin and providing appropriate anesthesia.
Vials Reconstituted: 2
Consent: Written consent obtained. Risks include but not limited to bruising, beading, irregular texture, ulceration, infection, allergic reaction, scar formation, incomplete augmentation, temporary nature, procedural pain.
Lot #: [id number]
Anesthesia Volume In Cc: 1
Reconstitution Date: 2/14/2022
Map Statement: See Attached Map for Complete Details.
Expiration Date (Month Year): 04/30/2024
Treatment Number: 3
Post-Care Instructions: Patient instructed to apply ice to reduce swelling.
Topical Anesthesia?: BLT cream (benzocaine 20%, lidocaine 10%, tetracaine 10%)
Volumizer: Sculptra

## 2022-05-16 ENCOUNTER — APPOINTMENT (RX ONLY)
Dept: RURAL CLINIC 1 | Facility: CLINIC | Age: 77
Setting detail: DERMATOLOGY
End: 2022-05-16

## 2022-05-16 DIAGNOSIS — Z41.9 ENCOUNTER FOR PROCEDURE FOR PURPOSES OTHER THAN REMEDYING HEALTH STATE, UNSPECIFIED: ICD-10-CM

## 2022-05-16 PROCEDURE — ? DYSPORT

## 2022-05-16 PROCEDURE — ? COSMETIC FOLLOW-UP

## 2022-05-16 PROCEDURE — ? RESTYLANE CONTOUR INJECTION

## 2022-05-16 NOTE — PROCEDURE: RESTYLANE CONTOUR INJECTION
Anesthesia Volume In Cc: 0.5
Detail Level: Detailed
Brows Filler  Volume In Cc: 0
Map Statement: See Attach Map for Complete Details
Expiration Date (Month Year): 04/30/2023
Filler: Restylane Contour
Price (Use Numbers Only, No Special Characters Or $): 362.26
Additional Anesthesia Volume In Cc: 6
Use Map Statement For Sites (Optional): No
Procedural Text: The filler was administered to the treatment areas noted above.
Consent: Written consent obtained. Risks include but not limited to bruising, beading, irregular texture, ulceration, infection, allergic reaction, scar formation, incomplete augmentation, temporary nature, procedural pain.
Anesthesia Type: 1% lidocaine with epinephrine
Number Of Syringes (Required For Inventory): 1
Lot #: [id number]
Post-Care Instructions: Patient instructed to apply ice to reduce swelling.
Topical Anesthesia?: BLT cream (benzocaine 20%, lidocaine 10%, tetracaine 10%)

## 2022-05-16 NOTE — PROCEDURE: MIPS QUALITY
Quality 431: Preventive Care And Screening: Unhealthy Alcohol Use - Screening: Patient not identified as an unhealthy alcohol user when screened for unhealthy alcohol use using a systematic screening method
Quality 111:Pneumonia Vaccination Status For Older Adults: Pneumococcal Vaccination Previously Received
Quality 130: Documentation Of Current Medications In The Medical Record: Current Medications Documented
Quality 226: Preventive Care And Screening: Tobacco Use: Screening And Cessation Intervention: Patient screened for tobacco use and is an ex/non-smoker
Detail Level: Detailed
Quality 47: Advance Care Plan: Advance care planning not documented, reason not otherwise specified.
Quality 110: Preventive Care And Screening: Influenza Immunization: Influenza Immunization Administered during Influenza season

## 2022-05-16 NOTE — PROCEDURE: DYSPORT
Left Periorbital Units: 0
Forehead Units: 60
Topical Anesthesia?: BLT cream (benzocaine 20%, lidocaine 10%, tetracaine 10%)
Consent: Written consent obtained. Risks include but not limited to lid/brow ptosis, bruising, swelling, diplopia, temporary effect, incomplete chemical denervation.
Show Additional Area 1: Yes
Show Right And Left Pupillary Line Units: No
Additional Area 1 Location: omkar
Reconstitution Date (Optional): 5/9/22
Post-Care Instructions: Patient instructed to not lie down for 4 hours and limit physical activity for 24 hours.
Additional Area 1 Units: 24
Expiration Date (Month Year): 12/31/22
Length Of Topical Anesthesia Application (Optional): 15 minutes
Price (Use Numbers Only, No Special Characters Or $): 0.00
Detail Level: Detailed
Lot #: [id number]
Additional Comments: pt is using 28 units of her 40 FREE Dysport units obtained in October 2021 Special today. 12 Fee units remaining.
Dilution (U/ 0.1cc): 4

## 2022-05-16 NOTE — PROCEDURE: COSMETIC FOLLOW-UP
Global Improvement: Minimal
Patient Satisfaction: Unsure
Comments (Free Text): Completed 6 vials of Sculptra.
Detail Level: Zone
Treatment Override (Free Text): Sculptra
Side Effects Or Complications: None

## 2022-07-12 ENCOUNTER — APPOINTMENT (RX ONLY)
Dept: RURAL CLINIC 1 | Facility: CLINIC | Age: 77
Setting detail: DERMATOLOGY
End: 2022-07-12

## 2022-07-12 VITALS — HEART RATE: 60 BPM | SYSTOLIC BLOOD PRESSURE: 154 MMHG | DIASTOLIC BLOOD PRESSURE: 89 MMHG

## 2022-07-12 DIAGNOSIS — Z71.89 OTHER SPECIFIED COUNSELING: ICD-10-CM

## 2022-07-12 DIAGNOSIS — D18.0 HEMANGIOMA: ICD-10-CM

## 2022-07-12 DIAGNOSIS — L82.1 OTHER SEBORRHEIC KERATOSIS: ICD-10-CM

## 2022-07-12 DIAGNOSIS — D22 MELANOCYTIC NEVI: ICD-10-CM

## 2022-07-12 PROBLEM — D22.71 MELANOCYTIC NEVI OF RIGHT LOWER LIMB, INCLUDING HIP: Status: ACTIVE | Noted: 2022-07-12

## 2022-07-12 PROBLEM — D18.01 HEMANGIOMA OF SKIN AND SUBCUTANEOUS TISSUE: Status: ACTIVE | Noted: 2022-07-12

## 2022-07-12 PROBLEM — D48.5 NEOPLASM OF UNCERTAIN BEHAVIOR OF SKIN: Status: ACTIVE | Noted: 2022-07-12

## 2022-07-12 PROBLEM — D22.62 MELANOCYTIC NEVI OF LEFT UPPER LIMB, INCLUDING SHOULDER: Status: ACTIVE | Noted: 2022-07-12

## 2022-07-12 PROBLEM — D22.5 MELANOCYTIC NEVI OF TRUNK: Status: ACTIVE | Noted: 2022-07-12

## 2022-07-12 PROBLEM — D22.61 MELANOCYTIC NEVI OF RIGHT UPPER LIMB, INCLUDING SHOULDER: Status: ACTIVE | Noted: 2022-07-12

## 2022-07-12 PROCEDURE — ? OBSERVATION AND MEASURE

## 2022-07-12 PROCEDURE — 99213 OFFICE O/P EST LOW 20 MIN: CPT

## 2022-07-12 PROCEDURE — ? COUNSELING

## 2022-07-12 ASSESSMENT — LOCATION ZONE DERM
LOCATION ZONE: NECK
LOCATION ZONE: TRUNK
LOCATION ZONE: HAND
LOCATION ZONE: LEG

## 2022-07-12 ASSESSMENT — LOCATION DETAILED DESCRIPTION DERM
LOCATION DETAILED: LEFT MID-UPPER BACK
LOCATION DETAILED: RIGHT PROXIMAL POSTERIOR THIGH
LOCATION DETAILED: MID POSTERIOR NECK
LOCATION DETAILED: RIGHT DORSAL MIDDLE FINGER METACARPOPHALANGEAL JOINT
LOCATION DETAILED: LEFT ULNAR DORSAL HAND
LOCATION DETAILED: RIGHT RADIAL DORSAL HAND

## 2022-07-12 ASSESSMENT — LOCATION SIMPLE DESCRIPTION DERM
LOCATION SIMPLE: RIGHT HAND
LOCATION SIMPLE: LEFT UPPER BACK
LOCATION SIMPLE: LEFT HAND
LOCATION SIMPLE: POSTERIOR NECK
LOCATION SIMPLE: RIGHT POSTERIOR THIGH

## 2022-07-12 NOTE — PROCEDURE: OBSERVATION
Instructions (Optional): Increased in size from .7 x .4 cm
Size Of Lesion: .8 x .4 cm
Detail Level: Detailed
Body Location Override (Optional - Billing Will Still Be Based On Selected Body Map Location If Applicable): left upper lateral back
Body Location Override (Optional - Billing Will Still Be Based On Selected Body Map Location If Applicable): right upper posterior thigh
Size Of Lesion: .1 x .1 cm
Body Location Override (Optional - Billing Will Still Be Based On Selected Body Map Location If Applicable): left Dorsal Hand
Size Of Lesion: .3 x .2 cm
Size Of Lesion: .3 x .3 cm
Body Location Override (Optional - Billing Will Still Be Based On Selected Body Map Location If Applicable): right dorsal hand proximal
Body Location Override (Optional - Billing Will Still Be Based On Selected Body Map Location If Applicable): left upper arm
Size Of Lesion: 1 x 1 cm
Body Location Override (Optional - Billing Will Still Be Based On Selected Body Map Location If Applicable): right dorsal hand distal
Size Of Lesion: .3 x .2 cm

## 2023-07-28 ENCOUNTER — APPOINTMENT (RX ONLY)
Dept: RURAL CLINIC 1 | Facility: CLINIC | Age: 78
Setting detail: DERMATOLOGY
End: 2023-07-28

## 2023-07-28 DIAGNOSIS — Z71.89 OTHER SPECIFIED COUNSELING: ICD-10-CM

## 2023-07-28 DIAGNOSIS — D18.0 HEMANGIOMA: ICD-10-CM

## 2023-07-28 DIAGNOSIS — D22 MELANOCYTIC NEVI: ICD-10-CM

## 2023-07-28 DIAGNOSIS — L82.1 OTHER SEBORRHEIC KERATOSIS: ICD-10-CM

## 2023-07-28 PROBLEM — D48.5 NEOPLASM OF UNCERTAIN BEHAVIOR OF SKIN: Status: ACTIVE | Noted: 2023-07-28

## 2023-07-28 PROBLEM — D18.01 HEMANGIOMA OF SKIN AND SUBCUTANEOUS TISSUE: Status: ACTIVE | Noted: 2023-07-28

## 2023-07-28 PROBLEM — D22.62 MELANOCYTIC NEVI OF LEFT UPPER LIMB, INCLUDING SHOULDER: Status: ACTIVE | Noted: 2023-07-28

## 2023-07-28 PROBLEM — D22.71 MELANOCYTIC NEVI OF RIGHT LOWER LIMB, INCLUDING HIP: Status: ACTIVE | Noted: 2023-07-28

## 2023-07-28 PROBLEM — D22.5 MELANOCYTIC NEVI OF TRUNK: Status: ACTIVE | Noted: 2023-07-28

## 2023-07-28 PROBLEM — D22.61 MELANOCYTIC NEVI OF RIGHT UPPER LIMB, INCLUDING SHOULDER: Status: ACTIVE | Noted: 2023-07-28

## 2023-07-28 PROCEDURE — ? COUNSELING

## 2023-07-28 PROCEDURE — ? OBSERVATION AND MEASURE

## 2023-07-28 PROCEDURE — 99213 OFFICE O/P EST LOW 20 MIN: CPT

## 2023-07-28 ASSESSMENT — LOCATION DETAILED DESCRIPTION DERM
LOCATION DETAILED: RIGHT RADIAL DORSAL HAND
LOCATION DETAILED: MID POSTERIOR NECK
LOCATION DETAILED: RIGHT DORSAL MIDDLE FINGER METACARPOPHALANGEAL JOINT
LOCATION DETAILED: LEFT MID-UPPER BACK
LOCATION DETAILED: RIGHT PROXIMAL POSTERIOR THIGH
LOCATION DETAILED: LEFT ULNAR DORSAL HAND

## 2023-07-28 ASSESSMENT — LOCATION SIMPLE DESCRIPTION DERM
LOCATION SIMPLE: RIGHT HAND
LOCATION SIMPLE: LEFT UPPER BACK
LOCATION SIMPLE: RIGHT POSTERIOR THIGH
LOCATION SIMPLE: POSTERIOR NECK
LOCATION SIMPLE: LEFT HAND

## 2023-07-28 ASSESSMENT — LOCATION ZONE DERM
LOCATION ZONE: NECK
LOCATION ZONE: HAND
LOCATION ZONE: LEG
LOCATION ZONE: TRUNK

## 2023-07-28 NOTE — PROCEDURE: OBSERVATION
Instructions (Optional): Increased in size from .7 x .4 cm
Size Of Lesion: .8 x .4 cm
Detail Level: Detailed
Body Location Override (Optional - Billing Will Still Be Based On Selected Body Map Location If Applicable): left upper lateral back
Body Location Override (Optional - Billing Will Still Be Based On Selected Body Map Location If Applicable): right upper posterior thigh
Size Of Lesion: .1 x .1 cm
Body Location Override (Optional - Billing Will Still Be Based On Selected Body Map Location If Applicable): left Dorsal Hand
Size Of Lesion: .3 x .2 cm
Body Location Override (Optional - Billing Will Still Be Based On Selected Body Map Location If Applicable): right dorsal hand distal
Size Of Lesion: .3 x .2 cm
Size Of Lesion: .3 x .3 cm
Body Location Override (Optional - Billing Will Still Be Based On Selected Body Map Location If Applicable): right dorsal hand proximal
Body Location Override (Optional - Billing Will Still Be Based On Selected Body Map Location If Applicable): left upper arm
Size Of Lesion: 1 x 1 cm

## 2025-01-08 ENCOUNTER — APPOINTMENT (OUTPATIENT)
Dept: RURAL CLINIC 1 | Facility: CLINIC | Age: 80
Setting detail: DERMATOLOGY
End: 2025-01-08

## 2025-01-08 DIAGNOSIS — L82.1 OTHER SEBORRHEIC KERATOSIS: ICD-10-CM

## 2025-01-08 DIAGNOSIS — Z71.89 OTHER SPECIFIED COUNSELING: ICD-10-CM

## 2025-01-08 DIAGNOSIS — D18.0 HEMANGIOMA: ICD-10-CM

## 2025-01-08 DIAGNOSIS — D22 MELANOCYTIC NEVI: ICD-10-CM

## 2025-01-08 PROBLEM — D48.5 NEOPLASM OF UNCERTAIN BEHAVIOR OF SKIN: Status: ACTIVE | Noted: 2025-01-08

## 2025-01-08 PROBLEM — D22.71 MELANOCYTIC NEVI OF RIGHT LOWER LIMB, INCLUDING HIP: Status: ACTIVE | Noted: 2025-01-08

## 2025-01-08 PROBLEM — D22.61 MELANOCYTIC NEVI OF RIGHT UPPER LIMB, INCLUDING SHOULDER: Status: ACTIVE | Noted: 2025-01-08

## 2025-01-08 PROBLEM — D18.01 HEMANGIOMA OF SKIN AND SUBCUTANEOUS TISSUE: Status: ACTIVE | Noted: 2025-01-08

## 2025-01-08 PROBLEM — D22.5 MELANOCYTIC NEVI OF TRUNK: Status: ACTIVE | Noted: 2025-01-08

## 2025-01-08 PROBLEM — D22.62 MELANOCYTIC NEVI OF LEFT UPPER LIMB, INCLUDING SHOULDER: Status: ACTIVE | Noted: 2025-01-08

## 2025-01-08 PROCEDURE — ? COUNSELING

## 2025-01-08 PROCEDURE — ? OBSERVATION AND MEASURE

## 2025-01-08 PROCEDURE — 99213 OFFICE O/P EST LOW 20 MIN: CPT

## 2025-01-08 PROCEDURE — ? TREATMENT REGIMEN

## 2025-01-08 ASSESSMENT — LOCATION DETAILED DESCRIPTION DERM
LOCATION DETAILED: RIGHT PROXIMAL POSTERIOR THIGH
LOCATION DETAILED: LEFT ULNAR DORSAL HAND
LOCATION DETAILED: MID POSTERIOR NECK
LOCATION DETAILED: RIGHT DORSAL MIDDLE FINGER METACARPOPHALANGEAL JOINT
LOCATION DETAILED: LEFT MID-UPPER BACK
LOCATION DETAILED: RIGHT RADIAL DORSAL HAND

## 2025-01-08 ASSESSMENT — LOCATION SIMPLE DESCRIPTION DERM
LOCATION SIMPLE: POSTERIOR NECK
LOCATION SIMPLE: LEFT HAND
LOCATION SIMPLE: RIGHT HAND
LOCATION SIMPLE: RIGHT POSTERIOR THIGH
LOCATION SIMPLE: LEFT UPPER BACK

## 2025-01-08 ASSESSMENT — LOCATION ZONE DERM
LOCATION ZONE: LEG
LOCATION ZONE: HAND
LOCATION ZONE: NECK
LOCATION ZONE: TRUNK

## 2025-01-08 NOTE — PROCEDURE: OBSERVATION
Instructions (Optional): Decreased in size from .8 x .4 cm
Size Of Lesion: .7 x .4 cm
Detail Level: Detailed
Body Location Override (Optional - Billing Will Still Be Based On Selected Body Map Location If Applicable): left upper lateral back
Body Location Override (Optional - Billing Will Still Be Based On Selected Body Map Location If Applicable): right upper posterior thigh
Size Of Lesion: .1 x .1 cm
Body Location Override (Optional - Billing Will Still Be Based On Selected Body Map Location If Applicable): left Dorsal Hand
Size Of Lesion: .3 x .2 cm
Body Location Override (Optional - Billing Will Still Be Based On Selected Body Map Location If Applicable): right dorsal hand distal
Body Location Override (Optional - Billing Will Still Be Based On Selected Body Map Location If Applicable): left upper arm
Size Of Lesion: 1 x 1 cm
Size Of Lesion: .3 x .3 cm
Body Location Override (Optional - Billing Will Still Be Based On Selected Body Map Location If Applicable): right dorsal hand proximal

## 2025-01-25 NOTE — PROCEDURE: MIPS QUALITY
Quality 47: Advance Care Plan: Advance care planning not documented, reason not otherwise specified.
Quality 431: Preventive Care And Screening: Unhealthy Alcohol Use - Screening: Patient screened for unhealthy alcohol use using a single question and scores less than 2 times per year
Quality 110: Preventive Care And Screening: Influenza Immunization: Influenza Immunization Administered during Influenza season
Quality 131: Pain Assessment And Follow-Up: Pain assessment using a standardized tool is documented as negative, no follow-up plan required
Quality 111:Pneumonia Vaccination Status For Older Adults: Pneumococcal Vaccination Previously Received
Quality 226: Preventive Care And Screening: Tobacco Use: Screening And Cessation Intervention: Patient screened for tobacco use and is an ex/non-smoker
Quality 130: Documentation Of Current Medications In The Medical Record: Current Medications Documented
Detail Level: Detailed
fall